# Patient Record
(demographics unavailable — no encounter records)

---

## 2025-07-23 NOTE — CONSULT LETTER
[Dear  ___] : Dear  [unfilled], [Consult Letter:] : I had the pleasure of evaluating your patient, [unfilled]. [Please see my note below.] : Please see my note below. [Consult Closing:] : Thank you very much for allowing me to participate in the care of this patient.  If you have any questions, please do not hesitate to contact me. [Sincerely,] : Sincerely, [FreeTextEntry3] : Jennifer Wright MD Department of Dermatology [facility]

## 2025-07-23 NOTE — HISTORY OF PRESENT ILLNESS
[FreeTextEntry1] : fp dry itchy skin [de-identified] : Referred by: Dr. Aldo Silveira   Mr. JESSICA OWENS  is a 3 year old M here for evaluation of below  #dry itchy skin  x a few months ago. got dots on legs. new bumps on knees and elbows on 7/19 also developed throat pain. derm hx: eczema  no personal or family h/o skin cancer S: aveeno M: aveeno lotion, balm D: F&C, no fs

## 2025-07-23 NOTE — CONSULT LETTER
[Dear  ___] : Dear  [unfilled], [Consult Letter:] : I had the pleasure of evaluating your patient, [unfilled]. [Please see my note below.] : Please see my note below. [Consult Closing:] : Thank you very much for allowing me to participate in the care of this patient.  If you have any questions, please do not hesitate to contact me. [Sincerely,] : Sincerely, [FreeTextEntry3] : Jennifer Wright MD Department of Dermatology [facility]

## 2025-07-23 NOTE — ASSESSMENT
[Use of independent historian: [ enter independent historian's relationship to patient ] :____] : As the patient was unable to provide a complete and reliable history, I obtained clinical history from the patient's [unfilled] [FreeTextEntry1] :  #atopic dermatitis, flaring with #xerosis discussed nature, chronicity and unpredictable course dry skin care reviewed, handout provided. Switch to recommended products in handout and moisturize liberally. - can start eucrisa to any minimally rough areas if covered, SED including initial stinging sensation - start triamcinolone 0.1% ointment BID to AA of rough skin on body PRN roughness Reviewed SE of topical steroids including skin thinning and discoloration and discussed avoidance of prolonged use.   Keratosis pilaris - Reassured regarding benign nature of condition  - Dry skin care reviewed including use of OTC emollients

## 2025-07-23 NOTE — PHYSICAL EXAM
[FreeTextEntry3] : eczeamtous papules and patches on trunk, popliteal fossae, legs xerosis keratotic perifollicular papules on arms

## 2025-07-23 NOTE — HISTORY OF PRESENT ILLNESS
[FreeTextEntry1] : fp dry itchy skin [de-identified] : Referred by: Dr. Aldo Silveira   Mr. JESSICA OWENS  is a 3 year old M here for evaluation of below  #dry itchy skin  x a few months ago. got dots on legs. new bumps on knees and elbows on 7/19 also developed throat pain. derm hx: eczema  no personal or family h/o skin cancer S: aveeno M: aveeno lotion, balm D: F&C, no fs